# Patient Record
Sex: MALE | Race: WHITE | NOT HISPANIC OR LATINO | Employment: FULL TIME | URBAN - METROPOLITAN AREA
[De-identification: names, ages, dates, MRNs, and addresses within clinical notes are randomized per-mention and may not be internally consistent; named-entity substitution may affect disease eponyms.]

---

## 2018-07-06 ENCOUNTER — OFFICE VISIT - HEALTHEAST (OUTPATIENT)
Dept: FAMILY MEDICINE | Facility: CLINIC | Age: 23
End: 2018-07-06

## 2018-07-06 DIAGNOSIS — M54.41 RIGHT-SIDED LOW BACK PAIN WITH RIGHT-SIDED SCIATICA: ICD-10-CM

## 2021-04-30 ENCOUNTER — COMMUNICATION - HEALTHEAST (OUTPATIENT)
Dept: TELEHEALTH | Facility: CLINIC | Age: 26
End: 2021-04-30

## 2021-04-30 ENCOUNTER — OFFICE VISIT - HEALTHEAST (OUTPATIENT)
Dept: FAMILY MEDICINE | Facility: CLINIC | Age: 26
End: 2021-04-30

## 2021-04-30 DIAGNOSIS — K60.2 ANAL FISSURE: ICD-10-CM

## 2021-04-30 ASSESSMENT — MIFFLIN-ST. JEOR: SCORE: 2020.92

## 2021-05-10 ENCOUNTER — OFFICE VISIT - HEALTHEAST (OUTPATIENT)
Dept: FAMILY MEDICINE | Facility: CLINIC | Age: 26
End: 2021-05-10

## 2021-05-10 DIAGNOSIS — G89.29 CHRONIC LEFT SHOULDER PAIN: ICD-10-CM

## 2021-05-10 DIAGNOSIS — M25.512 CHRONIC LEFT SHOULDER PAIN: ICD-10-CM

## 2021-05-10 DIAGNOSIS — Z11.3 SCREEN FOR SEXUALLY TRANSMITTED DISEASES: ICD-10-CM

## 2021-05-10 DIAGNOSIS — Z00.00 ROUTINE MEDICAL EXAM: ICD-10-CM

## 2021-05-10 ASSESSMENT — ANXIETY QUESTIONNAIRES
IF YOU CHECKED OFF ANY PROBLEMS ON THIS QUESTIONNAIRE, HOW DIFFICULT HAVE THESE PROBLEMS MADE IT FOR YOU TO DO YOUR WORK, TAKE CARE OF THINGS AT HOME, OR GET ALONG WITH OTHER PEOPLE: NOT DIFFICULT AT ALL
5. BEING SO RESTLESS THAT IT IS HARD TO SIT STILL: NOT AT ALL
2. NOT BEING ABLE TO STOP OR CONTROL WORRYING: NOT AT ALL
7. FEELING AFRAID AS IF SOMETHING AWFUL MIGHT HAPPEN: NOT AT ALL
6. BECOMING EASILY ANNOYED OR IRRITABLE: NOT AT ALL
4. TROUBLE RELAXING: NOT AT ALL
1. FEELING NERVOUS, ANXIOUS, OR ON EDGE: NOT AT ALL
3. WORRYING TOO MUCH ABOUT DIFFERENT THINGS: NOT AT ALL
GAD7 TOTAL SCORE: 0

## 2021-05-10 ASSESSMENT — MIFFLIN-ST. JEOR: SCORE: 2025.46

## 2021-05-10 ASSESSMENT — PATIENT HEALTH QUESTIONNAIRE - PHQ9: SUM OF ALL RESPONSES TO PHQ QUESTIONS 1-9: 1

## 2021-05-11 ENCOUNTER — COMMUNICATION - HEALTHEAST (OUTPATIENT)
Dept: FAMILY MEDICINE | Facility: CLINIC | Age: 26
End: 2021-05-11

## 2021-05-11 LAB
C TRACH DNA SPEC QL PROBE+SIG AMP: NEGATIVE
N GONORRHOEA DNA SPEC QL NAA+PROBE: NEGATIVE

## 2021-05-27 VITALS
BODY MASS INDEX: 29.78 KG/M2 | OXYGEN SATURATION: 96 % | DIASTOLIC BLOOD PRESSURE: 82 MMHG | HEART RATE: 87 BPM | HEIGHT: 72 IN | WEIGHT: 219.9 LBS | SYSTOLIC BLOOD PRESSURE: 122 MMHG

## 2021-05-27 ASSESSMENT — PATIENT HEALTH QUESTIONNAIRE - PHQ9: SUM OF ALL RESPONSES TO PHQ QUESTIONS 1-9: 1

## 2021-05-28 ASSESSMENT — ANXIETY QUESTIONNAIRES: GAD7 TOTAL SCORE: 0

## 2021-06-01 VITALS — WEIGHT: 198.5 LBS

## 2021-06-05 VITALS
BODY MASS INDEX: 29.65 KG/M2 | HEART RATE: 76 BPM | OXYGEN SATURATION: 98 % | DIASTOLIC BLOOD PRESSURE: 64 MMHG | SYSTOLIC BLOOD PRESSURE: 116 MMHG | WEIGHT: 218.9 LBS | HEIGHT: 72 IN

## 2021-06-16 PROBLEM — M25.512 CHRONIC LEFT SHOULDER PAIN: Status: ACTIVE | Noted: 2021-05-11

## 2021-06-16 PROBLEM — G89.29 CHRONIC LEFT SHOULDER PAIN: Status: ACTIVE | Noted: 2021-05-11

## 2021-06-17 NOTE — PROGRESS NOTES
Assessment & Plan     Anal fissure    He likely has a bit of an anal fissure and I told him that he would benefit from doing some sitz baths and then drying off and then using the Anusol HC that I prescribed for him just below here.  He can try that for a couple of weeks and see how that goes.  We also talked about trying to avoid dry wiping as much as he can and I suggested getting some flushable wipes or using washcloth most of the time to try to see if he can soothe the skin a little bit as well.  If he continues to have problems he will let me know.    We also did talk about him coming in for a physical at some point which we can do at his convenience.    - hydrocortisone (ANUSOL-HC) 2.5 % rectal cream; Apply rectally 2 times daily    Prescription drug management  [id number]}     BMI:   Estimated body mass index is 29.69 kg/m  as calculated from the following:    Height as of this encounter: 6' (1.829 m).    Weight as of this encounter: 218 lb 14.4 oz (99.3 kg).       No follow-ups on file.    Hubert Solano MD  [facility]   Link Barberestuardotyrell is 25 y.o. and presents today for the following health issues   HPI     Patient here today as a new patient to our clinic.  He is concerned that he might have some hemorrhoids or otherwise some issues down in the rectal area.  He states that he has had some blood on the paper after going to the bathroom for a few weeks now.  It does not seem to be getting any worse but is also not getting better.  He thought he should get it checked out.  He has not really had a change in his bowel movements.  He does run possibly a little bit on the constipated side but nothing different for him.  He has not really tried anything over-the-counter for this yet.      Review of Systems    A comprehensive Review of Systems was performed, and other than the positives mentioned above, the ROS was negative.     Patient is new patient to the clinic.  Please see past medical history, surgical history, social history and family history, all of which were completed in their entirety today.           Objective    /64 (Patient Site: Left Arm, Patient Position: Sitting, Cuff Size: Adult Regular)   Pulse 76   Ht 6' (1.829 m)   Wt 218 lb 14.4 oz (99.3 kg)   SpO2 98%   BMI 29.69 kg/m    Body mass index is 29.69 kg/m .  Physical Exam    Well-appearing male in no acute distress.  Vital signs as noted.  Chest clear to auscultation.  Heart regular rate and rhythm.  The rectal exam looks pretty unremarkable with no obvious hemorrhoids seen.  No active bleeding seen.

## 2021-06-21 NOTE — LETTER
Letter by Hubert Solano MD at      Author: Hubert Solano MD Service: -- Author Type: --    Filed:  Encounter Date: 5/11/2021 Status: (Other)         Link GUAJARDO Genotyrell  [address]  [place] [address]             May 11, 2021         Dear Mr. Bhardwaj,    Below are the results from your recent visit:    Resulted Orders   Chlamydia trachomatis & Neisseria gonorrhoeae, Amplified Detection   Result Value Ref Range    Chlamydia trachomatis, Amplified Detection Negative Negative    Neisseria gonorrhoeae, Amplified Detection Negative Negative        GC and Chlamydia tests are negative    Please call with questions or contact us using [employer].    Sincerely,        Electronically signed by Hubert Solano MD

## 2021-06-26 NOTE — PROGRESS NOTES
Progress Notes by Alexus Meyer CNP at 7/6/2018  2:30 PM     Author: Alexus Meyer CNP Service: -- Author Type: Nurse Practitioner    Filed: 7/6/2018  2:56 PM Encounter Date: 7/6/2018 Status: Signed    : Alexus Meyer CNP (Nurse Practitioner)       ASSESSMENT:   1. Right-sided low back pain with right-sided sciatica  predniSONE (DELTASONE) 20 MG tablet    TiZANidine (ZANAFLEX) 4 MG capsule       PLAN:  Link Bhardwaj is a 22 y.o. male who presents to the clinic today for evaluation of back pain.    Differential diagnosis for back pain includes muscle spasm/strain, slipped disc w/ radicular pain including sciatica, slipped disc w/ cauda equina syndrome,vertebral fracture, vertebral tumor, epidural abscess / discitis, or pyelonephritis.      I do not believe a fracture to be the source of this patient's pain as there was no preceding blunt trauma.  Based on no trauma, no imaging of the spine was done.      I do not believe the pain is caused by an epidural abscess as the patient denies hx of IV drug use and does not have fevers/chills and no recent procedures.      I do not believe this patient's pain is from an infiltrative vertebral tumor as the patient does not have weight loss or night sweats and no known history of cancer.      I do not believe this patient's pain represents a rupture AAA.  There is no palpable, pulsatile mass on exam and patient does not have any ABD pain.      Patient do not have urinary symptoms or CVA tenderness to suggest pyelonephritis.      Based on history and exam, the most likely etiology of this patient's low back pain is muscle spasm/strain.  Emergent MRI is not indicated as this patient does not have new weakness or cauda equina syndrome.  Patient has no bowel or bladder incontinence. Will treat today with prednisone and zanaflex, patient to follow up with PCP if no improvement over the next 5-7 days.  Will seek emergency care with new  weakness/paresthesias, loss of continence, fever or worsening symptoms.      I discussed red flag symptoms, return precautions to clinic/ER and follow up care with patient/parent.  Expected clinical course, symptomatic cares advised. Questions answered. Patient/parent amenable with plan.    Patient Instructions:  Patient Instructions   Your back pain is likely due to a muscular strain.     I have prescribed you a short course of Prednisone to help decrease the inflammation in the area of the spasm. This should help to decrease associated numbness/tingling (nerve pain). Please take as directed. Be aware that this medication can cause insomnia, excitability, excessive hunger.  Please take it in the morning with food.    Do not take ibuprofen while on the steroid. If having pain, take Tylenol up to 1000mg, 4 times daily.    You may use the Zanaflex as needed for muscle spasm. Use caution while taking this medication, as it can make you drowsy. Do not take while driving, operating heavy machinery, or doing any activities requiring intense concentration.    Salanpas topical lidocaine ointment may also be helpful.    Try using a heating pad and/or warm baths.    Make sure to keep moving to avoid getting stiff. See below for stretching exercises.    If you develop fever, severe pain that prevents you from walking at all, weakness of your arms or legs, loss of bowel or bladder continence, or any other new concerning symptoms, go to the ER immediately.    Otherwise, follow up with primary care doctor as needed or if no improvement in pain in symptoms in 1 week.                    SUBJECTIVE:   Link Bhardwaj is a 22 y.o. male who presents today with right sided low back pain that began acutely during a dead lift yesterday at the gym. Pain was instant, dropped the weight.  No lower extremity weakness or paresthesias, no saddle anesthesia, no buttock or leg pain. No loss of bowel or bladder continence. No hx of CA, recent  procedures or IVDU. No fevers. No dysuria, hematuria, frequency.  Has taken ibuprofen without relief.      ROS:  Comprehensive 12 pt ROS completed, positives noted in HPI, otherwise negative.      Past Medical History:  There is no problem list on file for this patient.    none  Surgical History:  No past surgical history on file.    none    Family History:  No family history on file.    Reviewed; Non-contributory    History   Smoking Status   ? Never Smoker   Smokeless Tobacco   ? Never Used       Current Medications:  No current outpatient prescriptions on file prior to visit.     No current facility-administered medications on file prior to visit.        Allergies:   No Known Allergies    OBJECTIVE:   Vitals:    07/06/18 1438   BP: 126/74   Patient Site: Right Arm   Patient Position: Sitting   Cuff Size: Adult Regular   Pulse: 81   Resp: 18   Temp: 98.3  F (36.8  C)   TempSrc: Oral   SpO2: 99%   Weight: 198 lb 8 oz (90 kg)     Physical exam reveals a pleasant 22 y.o. male.   General Appearance:  Alert, cooperative, no distress, appears stated age. Afebrile. Appears comfortable sitting in chair. Rises from seated position without difficulty.  Integument: Warm, dry, no rashes or lesions.  HEENT: Atraumatic, normocephalic. Face nontraumatic. Conjunctiva clear, Lids normal.  Neck: Supple, no meningismus. No Cspine tenderness. Neck ROM intact.  Respiratory: No distress. Lungs clear to ausculation bilaterally. No crackles, wheezes, rhonchi or stridor.  Cardiovascular: Regular rate and rhythm, no murmur, rub or gallop. No obvious chest wall deformities.  Abdomen: soft, nontender, non-distended. No masses. No CVAT.  Musculoskeletal: Back: No Lspine or Tspine tenderness or crepitus to palpation. Mild TTP right lumbar paraspinal musculature. Strength testing: hip flexion, extension 5/5 bilaterally, knee flexion/extension 5/5 bilaterally, ankle plantar/dorsiflexion 5/5 bilaterally.  Straight leg raise positive to right.  Gait: observed, no antalgia or abnormalities. Steady gait. 2+ bilateral pedal pulses.  Normal toe raise, heel walk. Normal flexion and extension of toes.  Neurologic: Alert and orientated appropriately. No focal deficits. Sensation intact in distal LEs. DTRs: patellar 2+ bilaterally, achilles 2+ bilaterally.  Psych: Normal mood and affect.           RADIOLOGY    none  LABORATORY STUDIES    none      Alexus Meyer, CNP

## 2021-06-30 NOTE — PROGRESS NOTES
Progress Notes by Hubert Solano MD at 5/10/2021  4:00 PM     Author: Hubert Solano MD Service: -- Author Type: Physician    Filed: 5/11/2021  7:13 AM Encounter Date: 5/10/2021 Status: Signed    : Hubert Solano MD (Physician)       MALE PREVENTATIVE EXAM    Assessment and Plan:     Patient has been advised of split billing requirements and indicates understanding: Yes    1. Routine medical exam    Generally the patient is doing very well.  We discussed healthy lifestyles, including adequate exercise (3-4 times a week for 20-30 minutes), and a healthy diet.  Patient should return for annual physicals, and we can also see them here as needed.       2. Chronic left shoulder pain    I assured him that I think his shoulder exam is relatively unremarkable, but he may have a slightly subluxable shoulder that he is starting to notice a bit now.  Talked about some different options for him but for now he is just going to watch it and if it seems to be getting worse or not improving, or causing more pain or function issues then he will let me know and we can consider either an MRI, some physical therapy, or a orthopedic referral.      3. Screen for sexually transmitted diseases    He would like to get some testing done as listed below.  He has done the symptoms right now but would like to just make sure if things seem okay.      - Chlamydia trachomatis & Neisseria gonorrhoeae, Amplified Detection     Next follow up:  No follow-ups on file.    Immunization Review  Adult Imm Review: No immunizations due today      I discussed the following with the patient:   Adult Healthy Living: Importance of regular exercise  Healthy nutrition  Getting adequate sleep  Stress management  Use of seat belts  STI prevention        Subjective:   Chief Complaint: Link Bhardwaj is an 25 y.o. male here for a preventative health visit.    Patient has been advised of split billing requirements and indicates understanding: Yes  HPI: Patient  "here today for a physical.  We just saw him recently for some rectal itching and he states that that is improving with the cream that we gave him.    He has no significantly major issues, other than like to talk about getting screened for chlamydia and gonorrhea is that is a concern for him having been with a couple of partners unprotected.    He also has some issues with his left shoulder which are pretty minimal but starting to bother him a bit.  He does do some pretty significant lifting of weights and the like, and he has noticed that his left shoulder just seems a little \"different\" than the other one and is not really sure how to describe it but it feels like sometimes it partially comes out of joint or gives him more discomfort than the other side.    Healthy Habits  Are you taking a daily aspirin? No  Do you typically exercising at least 40 min, 3-4 times per week?  Yes  Do you usually eat at least 4 servings of fruit and vegetables a day, include whole grains and fiber and avoid regularly eating high fat foods? Yes  Have you had an eye exam in the past two years? NO  Do you see a dentist twice per year? NO  Do you have any concerns regarding sleep? No      Review of Systems:  Please see above.  The rest of the review of systems are negative for all systems.     Cancer Screening     Patient has no health maintenance due at this time          Patient Care Team:  Hubert Solano MD as PCP - General (Family Medicine)        History     Not marked as reviewed during this visit.            Objective:   Vital Signs:   Visit Vitals  /82 (Patient Site: Left Arm, Patient Position: Sitting, Cuff Size: Adult Large)   Pulse 87   Ht 6' (1.829 m)   Wt 219 lb 14.4 oz (99.7 kg)   SpO2 96%   BMI 29.82 kg/m           PHYSICAL EXAM    Well-appearing male in no acute distress.  Vital signs as noted.  Ears are clear bilaterally.  Eyes and nose are normal.  Oropharynx pink and moist without erythema or exudate.  Neck is " without lymphadenopathy.    Chest clear to auscultation.  Heart regular rate and rhythm.  Abdomen soft nontender nondistended bowel sounds present all quadrants.    His left shoulder shows normal range of motion with good strength and sensation noted.  Negative Neer's sign.  Negative supraspinatus test.    Extremities are unremarkable.  Neurologic exam is nonfocal.  Genital exam is normal.           Medication List          Accurate as of May 10, 2021 11:59 PM. If you have any questions, ask your nurse or doctor.            CONTINUE taking these medications    hydrocortisone 2.5 % rectal cream  Also known as: Anusol-HC  INSTRUCTIONS: Apply rectally 2 times daily               Additional Screenings Completed Today:

## 2021-07-23 ENCOUNTER — TELEPHONE (OUTPATIENT)
Dept: FAMILY MEDICINE | Facility: CLINIC | Age: 26
End: 2021-07-23

## 2021-07-23 DIAGNOSIS — G89.29 CHRONIC LEFT SHOULDER PAIN: Primary | ICD-10-CM

## 2021-07-23 DIAGNOSIS — M25.562 CHRONIC PAIN OF LEFT KNEE: ICD-10-CM

## 2021-07-23 DIAGNOSIS — G89.29 CHRONIC PAIN OF LEFT KNEE: ICD-10-CM

## 2021-07-23 DIAGNOSIS — M25.512 CHRONIC LEFT SHOULDER PAIN: Primary | ICD-10-CM

## 2021-07-23 NOTE — TELEPHONE ENCOUNTER
Reason for Call: Request for an order or referral:    Order or referral being requested: Patient is requesting referral for Physical Therapy for his right shoulder and left hip pain.     Date needed: as soon as possible    Has the patient been seen by the PCP for this problem? YES    Phone number Patient can be reached at:  Cell number on file:    Telephone Information:   Mobile [phone]       Best Time:  anytime    Can we leave a detailed message on this number?  YES    Call taken on 7/23/2021 at 9:25 AM by Skyla Klein

## 2021-08-09 ENCOUNTER — HOSPITAL ENCOUNTER (OUTPATIENT)
Dept: PHYSICAL THERAPY | Facility: REHABILITATION | Age: 26
End: 2021-08-09
Attending: FAMILY MEDICINE
Payer: COMMERCIAL

## 2021-08-09 DIAGNOSIS — M25.511 ACUTE PAIN OF RIGHT SHOULDER: Primary | ICD-10-CM

## 2021-08-09 DIAGNOSIS — M25.562 CHRONIC PAIN OF LEFT KNEE: ICD-10-CM

## 2021-08-09 DIAGNOSIS — M25.512 CHRONIC LEFT SHOULDER PAIN: ICD-10-CM

## 2021-08-09 DIAGNOSIS — G89.29 CHRONIC LEFT SHOULDER PAIN: ICD-10-CM

## 2021-08-09 DIAGNOSIS — M25.552 HIP PAIN, LEFT: ICD-10-CM

## 2021-08-09 DIAGNOSIS — G89.29 CHRONIC PAIN OF LEFT KNEE: ICD-10-CM

## 2021-08-09 PROCEDURE — 97161 PT EVAL LOW COMPLEX 20 MIN: CPT | Mod: GP | Performed by: PHYSICAL THERAPIST

## 2021-08-09 PROCEDURE — 97535 SELF CARE MNGMENT TRAINING: CPT | Mod: GP | Performed by: PHYSICAL THERAPIST

## 2021-08-09 PROCEDURE — 97110 THERAPEUTIC EXERCISES: CPT | Mod: GP | Performed by: PHYSICAL THERAPIST

## 2021-08-26 ENCOUNTER — THERAPY VISIT (OUTPATIENT)
Dept: PHYSICAL THERAPY | Facility: CLINIC | Age: 26
End: 2021-08-26
Payer: COMMERCIAL

## 2021-08-26 DIAGNOSIS — M67.959 TENDINOPATHY OF GLUTEAL REGION: ICD-10-CM

## 2021-08-26 DIAGNOSIS — M25.519 ACROMIOCLAVICULAR JOINT PAIN: Primary | ICD-10-CM

## 2021-08-26 PROCEDURE — 97530 THERAPEUTIC ACTIVITIES: CPT | Mod: GP | Performed by: PHYSICAL THERAPIST

## 2021-08-26 PROCEDURE — 97110 THERAPEUTIC EXERCISES: CPT | Mod: GP | Performed by: PHYSICAL THERAPIST

## 2021-08-26 NOTE — PROGRESS NOTES
Physical Therapy Initial Evaluation  Subjective:  The history is provided by the patient. No  was used.   Patient Health History    Date of Onset: date of order: 8/9/21.                                                                 Objective:  System    Physical Exam    General     ROS    Assessment/Plan:    {REHAB NOTES:105783}

## 2021-09-09 NOTE — PROGRESS NOTES
Outpatient Physical Therapy Discharge Note     Patient: Link Bhardwaj  : 1995    Beginning/End Dates of Reporting Period:  22 for evaluation only    Referring Provider: Dr. Solano    Therapy Diagnosis: Hip and shoulder pain     Client Self Report: Pain in shoulder with pushing like bench press.  Have not lifted in 2 months so slightly better but still painful to touch.  Left hip has hurt past 1 1/2 months and more with any squatting.  Squatted first time in two weeks and pain returned.      Goals:  Goal Identifier lifting at gym   Goal Description able to resume pushing movements such as bench press, at gym with pain 0-1/10   Target Date 10/08/21   Date Met      Progress (detail required for progress note):       Goal Identifier squatting   Goal Description Able to squat at the gym for exercise and at home to pick objects up from floor with pain 0-110   Target Date 10/08/21   Date Met      Progress (detail required for progress note):       Goal Identifier     Goal Description     Target Date     Date Met      Progress (detail required for progress note):       Goal Identifier     Goal Description     Target Date     Date Met      Progress (detail required for progress note):       Goal Identifier     Goal Description     Target Date     Date Met      Progress (detail required for progress note):       Goal Identifier     Goal Description     Target Date     Date Met      Progress (detail required for progress note):       Goal Identifier     Goal Description     Target Date     Date Met      Progress (detail required for progress note):       Goal Identifier     Goal Description     Target Date     Date Met      Progress (detail required for progress note):             Plan:  Discharge from therapy.    Discharge:    Reason for Discharge: pt moved and will see another provider    Equipment Issued: none    Discharge Plan: Patient to continue home program.

## 2021-09-09 NOTE — ADDENDUM NOTE
Encounter addended by: Caitlin No, PT on: 9/9/2021 7:46 AM   Actions taken: Episode edited, Episode resolved, Clinical Note Signed

## 2021-09-16 ENCOUNTER — THERAPY VISIT (OUTPATIENT)
Dept: PHYSICAL THERAPY | Facility: CLINIC | Age: 26
End: 2021-09-16
Payer: COMMERCIAL

## 2021-09-16 DIAGNOSIS — M25.552 HIP PAIN, LEFT: ICD-10-CM

## 2021-09-16 DIAGNOSIS — M67.959 TENDINOPATHY OF GLUTEAL REGION: Primary | ICD-10-CM

## 2021-09-16 DIAGNOSIS — M25.519 ACROMIOCLAVICULAR JOINT PAIN: ICD-10-CM

## 2021-09-16 DIAGNOSIS — M25.512 CHRONIC LEFT SHOULDER PAIN: ICD-10-CM

## 2021-09-16 DIAGNOSIS — G89.29 CHRONIC LEFT SHOULDER PAIN: ICD-10-CM

## 2021-09-16 PROCEDURE — 97110 THERAPEUTIC EXERCISES: CPT | Mod: GP | Performed by: PHYSICAL THERAPIST

## 2021-09-22 ENCOUNTER — THERAPY VISIT (OUTPATIENT)
Dept: PHYSICAL THERAPY | Facility: CLINIC | Age: 26
End: 2021-09-22
Payer: COMMERCIAL

## 2021-09-22 DIAGNOSIS — M25.512 CHRONIC LEFT SHOULDER PAIN: Primary | ICD-10-CM

## 2021-09-22 DIAGNOSIS — G89.29 CHRONIC LEFT SHOULDER PAIN: Primary | ICD-10-CM

## 2021-09-22 DIAGNOSIS — M25.552 HIP PAIN, LEFT: ICD-10-CM

## 2021-09-22 PROCEDURE — 97530 THERAPEUTIC ACTIVITIES: CPT | Mod: GP | Performed by: PHYSICAL THERAPIST

## 2021-09-22 PROCEDURE — 97110 THERAPEUTIC EXERCISES: CPT | Mod: GP | Performed by: PHYSICAL THERAPIST

## 2021-09-22 NOTE — PROGRESS NOTES
Therapist Impression:   Suspect impingement with lifting secondary to poor posture.  Advised floor bench and/or towel barbell bench.  Suspect quadratus femoris tendinopathy.  Initiated clamshell isometrics holds    GOALS: Lifting    NEXT: Soft tissue, isotonics in future    PTRX: NA    Subjective:  Feels shoulder a little bit more.  Hip is feeling ok.  But has not tested hip as much.  Shoulder is bothered by flat bench>incline and more weight.  Squats bother hip.  Cross body has improved.    Objective:  Shoulder  STATIC POSTURE  Forward head: mild   Rounded shoulders:moderate  Shoulder internally rotated: moderate     SHOULDER RANGE OF MOTION  Full pain-free    SHOULDER STRENGTH  MMT Flexion Scaption ER IR   Left 5-/5 5/5 5-/5 5/5   Right 5-/5 5/5 5-/5 5/5     HIP    Dynamic Movement Screen  Double limb squat observations: Good technique/no significant findings    Range of Motion    Hip ROM Flexion ER  IR Extension   Left 120 45 25 20   Right 120 45 25 20      Flexibility Quadriceps Hamstrings Ankle   Left wnl mild na   Right wnl mild na     Hip and Knee Strength   MMT Hip Abduction Hip Extension   Left 4+/5 4/5   Right 4+/5 4/5     Special Tests   PEPPERIR CHET   Left Negative Negative   Right Negative Negative

## 2021-11-19 PROBLEM — G89.29 CHRONIC LEFT SHOULDER PAIN: Status: RESOLVED | Noted: 2021-05-11 | Resolved: 2021-11-19

## 2021-11-19 PROBLEM — M25.552 HIP PAIN, LEFT: Status: RESOLVED | Noted: 2021-09-22 | Resolved: 2021-11-19

## 2021-11-19 PROBLEM — M25.512 CHRONIC LEFT SHOULDER PAIN: Status: RESOLVED | Noted: 2021-05-11 | Resolved: 2021-11-19

## 2022-07-23 ENCOUNTER — HEALTH MAINTENANCE LETTER (OUTPATIENT)
Age: 27
End: 2022-07-23

## 2022-08-01 ENCOUNTER — OFFICE VISIT (OUTPATIENT)
Dept: FAMILY MEDICINE | Facility: CLINIC | Age: 27
End: 2022-08-01
Payer: COMMERCIAL

## 2022-08-01 VITALS
SYSTOLIC BLOOD PRESSURE: 108 MMHG | HEIGHT: 72 IN | BODY MASS INDEX: 27.9 KG/M2 | WEIGHT: 206 LBS | TEMPERATURE: 97.5 F | RESPIRATION RATE: 22 BRPM | HEART RATE: 83 BPM | OXYGEN SATURATION: 99 % | DIASTOLIC BLOOD PRESSURE: 62 MMHG

## 2022-08-01 DIAGNOSIS — Z00.00 ROUTINE GENERAL MEDICAL EXAMINATION AT A HEALTH CARE FACILITY: Primary | ICD-10-CM

## 2022-08-01 DIAGNOSIS — Z11.4 SCREENING FOR HIV (HUMAN IMMUNODEFICIENCY VIRUS): ICD-10-CM

## 2022-08-01 DIAGNOSIS — Z11.3 SCREEN FOR STD (SEXUALLY TRANSMITTED DISEASE): ICD-10-CM

## 2022-08-01 DIAGNOSIS — Z11.59 NEED FOR HEPATITIS C SCREENING TEST: ICD-10-CM

## 2022-08-01 DIAGNOSIS — Z23 HIGH PRIORITY FOR 2019-NCOV VACCINE: ICD-10-CM

## 2022-08-01 LAB
BASOPHILS # BLD AUTO: 0 10E3/UL (ref 0–0.2)
BASOPHILS NFR BLD AUTO: 1 %
EOSINOPHIL # BLD AUTO: 0.2 10E3/UL (ref 0–0.7)
EOSINOPHIL NFR BLD AUTO: 3 %
ERYTHROCYTE [DISTWIDTH] IN BLOOD BY AUTOMATED COUNT: 13.2 % (ref 10–15)
HCT VFR BLD AUTO: 46.1 % (ref 40–53)
HGB BLD-MCNC: 15.6 G/DL (ref 13.3–17.7)
LYMPHOCYTES # BLD AUTO: 1.7 10E3/UL (ref 0.8–5.3)
LYMPHOCYTES NFR BLD AUTO: 29 %
MCH RBC QN AUTO: 28.7 PG (ref 26.5–33)
MCHC RBC AUTO-ENTMCNC: 33.8 G/DL (ref 31.5–36.5)
MCV RBC AUTO: 85 FL (ref 78–100)
MONOCYTES # BLD AUTO: 0.4 10E3/UL (ref 0–1.3)
MONOCYTES NFR BLD AUTO: 7 %
NEUTROPHILS # BLD AUTO: 3.4 10E3/UL (ref 1.6–8.3)
NEUTROPHILS NFR BLD AUTO: 61 %
PLATELET # BLD AUTO: 214 10E3/UL (ref 150–450)
RBC # BLD AUTO: 5.44 10E6/UL (ref 4.4–5.9)
WBC # BLD AUTO: 5.6 10E3/UL (ref 4–11)

## 2022-08-01 PROCEDURE — 87591 N.GONORRHOEAE DNA AMP PROB: CPT | Performed by: FAMILY MEDICINE

## 2022-08-01 PROCEDURE — 36415 COLL VENOUS BLD VENIPUNCTURE: CPT | Performed by: FAMILY MEDICINE

## 2022-08-01 PROCEDURE — 91305 COVID-19,PF,PFIZER (12+ YRS): CPT | Performed by: FAMILY MEDICINE

## 2022-08-01 PROCEDURE — 86780 TREPONEMA PALLIDUM: CPT | Performed by: FAMILY MEDICINE

## 2022-08-01 PROCEDURE — 87491 CHLMYD TRACH DNA AMP PROBE: CPT | Performed by: FAMILY MEDICINE

## 2022-08-01 PROCEDURE — 80061 LIPID PANEL: CPT | Performed by: FAMILY MEDICINE

## 2022-08-01 PROCEDURE — 85025 COMPLETE CBC W/AUTO DIFF WBC: CPT | Performed by: FAMILY MEDICINE

## 2022-08-01 PROCEDURE — 87389 HIV-1 AG W/HIV-1&-2 AB AG IA: CPT | Performed by: FAMILY MEDICINE

## 2022-08-01 PROCEDURE — 0054A COVID-19,PF,PFIZER (12+ YRS): CPT | Performed by: FAMILY MEDICINE

## 2022-08-01 PROCEDURE — 99395 PREV VISIT EST AGE 18-39: CPT | Mod: 25 | Performed by: FAMILY MEDICINE

## 2022-08-01 PROCEDURE — 86803 HEPATITIS C AB TEST: CPT | Performed by: FAMILY MEDICINE

## 2022-08-01 PROCEDURE — 80053 COMPREHEN METABOLIC PANEL: CPT | Performed by: FAMILY MEDICINE

## 2022-08-01 ASSESSMENT — ENCOUNTER SYMPTOMS
CHILLS: 0
PSYCHIATRIC NEGATIVE: 1
ABDOMINAL PAIN: 0
RESPIRATORY NEGATIVE: 1
NEUROLOGICAL NEGATIVE: 1
HEMATOCHEZIA: 0
MUSCULOSKELETAL NEGATIVE: 1
HEMATOLOGIC/LYMPHATIC NEGATIVE: 1
HEMATURIA: 0
ENDOCRINE NEGATIVE: 1
ALLERGIC/IMMUNOLOGIC NEGATIVE: 1

## 2022-08-01 ASSESSMENT — PAIN SCALES - GENERAL: PAINLEVEL: NO PAIN (0)

## 2022-08-01 NOTE — PROGRESS NOTES
SUBJECTIVE:   CC: Link Bhardwaj is an 26 year old male who presents for preventative health visit.   Physical exam , no past medical history  Would like to have screening for sexually transmitted disease.  He has no symptoms, no previous history.    Patient has been advised of split billing requirements and indicates understanding: Yes  Healthy Habits:     Getting at least 3 servings of Calcium per day:  Yes    Bi-annual eye exam:  NO    Dental care twice a year:  NO    Sleep apnea or symptoms of sleep apnea:  None    Diet:  Regular (no restrictions)    Frequency of exercise:  4-5 days/week    Duration of exercise:  45-60 minutes    Taking medications regularly:  Yes    Barriers to taking medications:  None    Medication side effects:  Not applicable    PHQ-2 Total Score: 0    Additional concerns today:  Yes      Today's PHQ-2 Score:   PHQ-2 ( 1999 [employer]) 8/1/2022   Q1: Little interest or pleasure in doing things 0   Q2: Feeling down, depressed or hopeless 0   PHQ-2 Score 0   Q1: Little interest or pleasure in doing things Not at all   Q2: Feeling down, depressed or hopeless Not at all   PHQ-2 Score 0       Abuse: Current or Past(Physical, Sexual or Emotional)- No  Do you feel safe in your environment? Yes    Have you ever done Advance Care Planning? (For example, a Health Directive, POLST, or a discussion with a medical provider or your loved ones about your wishes): No, advance care planning information given to patient to review.  Patient declined advance care planning discussion at this time.    Social History     Tobacco Use     Smoking status: Never Smoker     Smokeless tobacco: Never Used   Substance Use Topics     Alcohol use: Yes     Alcohol/week: 1.0 standard drink     If you drink alcohol do you typically have >3 drinks per day or >7 drinks per week? No    Alcohol Use 8/1/2022   Prescreen: >3 drinks/day or >7 drinks/week? No   No flowsheet data found.    Last PSA: No results found for:  PSA    Reviewed orders with patient. Reviewed health maintenance and updated orders accordingly - Yes  Labs reviewed in EPIC  BP Readings from Last 3 Encounters:   08/01/22 108/62   05/10/21 122/82   04/30/21 116/64    Wt Readings from Last 3 Encounters:   08/01/22 93.4 kg (206 lb)   05/10/21 99.7 kg (219 lb 14.4 oz)   04/30/21 99.3 kg (218 lb 14.4 oz)                  Patient Active Problem List   Diagnosis   (none) - all problems resolved or deleted     History reviewed. No pertinent surgical history.    Social History     Tobacco Use     Smoking status: Never Smoker     Smokeless tobacco: Never Used   Substance Use Topics     Alcohol use: Yes     Alcohol/week: 1.0 standard drink     History reviewed. No pertinent family history.      Current Outpatient Medications   Medication Sig Dispense Refill     hydrocortisone (ANUSOL-HC) 2.5 % rectal cream [HYDROCORTISONE (ANUSOL-HC) 2.5 % RECTAL CREAM] Apply rectally 2 times daily (Patient not taking: Reported on 8/1/2022) 30 g 1       Reviewed and updated as needed this visit by clinical staff   Tobacco  Allergies  Meds   Med Hx  Surg Hx  Fam Hx  Soc Hx          Reviewed and updated as needed this visit by Provider                   History reviewed. No pertinent past medical history.   History reviewed. No pertinent surgical history.  OB History   No obstetric history on file.       Review of Systems   Constitutional: Negative for chills.   HENT: Negative for congestion.    Respiratory: Negative.    Cardiovascular: Negative for chest pain.   Gastrointestinal: Negative for abdominal pain and hematochezia.   Endocrine: Negative.    Genitourinary: Negative for hematuria.   Musculoskeletal: Negative.    Skin: Negative.    Allergic/Immunologic: Negative.    Neurological: Negative.    Hematological: Negative.    Psychiatric/Behavioral: Negative.      CONSTITUTIONAL: NEGATIVE for fever, chills, change in weight  INTEGUMENTARY/SKIN: NEGATIVE for worrisome rashes, moles or  "lesions  EYES: NEGATIVE for vision changes or irritation  ENT: NEGATIVE for ear, mouth and throat problems  RESP: NEGATIVE for significant cough or SOB  CV: NEGATIVE for chest pain, palpitations or peripheral edema  GI: NEGATIVE for nausea, abdominal pain, heartburn, or change in bowel habits   male: negative for dysuria, hematuria, decreased urinary stream, erectile dysfunction, urethral discharge  MUSCULOSKELETAL: NEGATIVE for significant arthralgias or myalgia  NEURO: NEGATIVE for weakness, dizziness or paresthesias  ENDOCRINE: NEGATIVE for temperature intolerance, skin/hair changes  HEME/ALLERGY/IMMUNE: NEGATIVE for bleeding problems  PSYCHIATRIC: NEGATIVE for changes in mood or affect    OBJECTIVE:   Pulse 83   Temp 97.5  F (36.4  C) (Tympanic)   Resp 22   Ht 1.83 m (6' 0.05\")   Wt 93.4 kg (206 lb)   SpO2 99%   BMI 27.90 kg/m      Physical Exam  GENERAL: healthy, alert and no distress  EYES: Eyes grossly normal to inspection, PERRL and conjunctivae and sclerae normal  HENT: ear canals and TM's normal, nose and mouth without ulcers or lesions  NECK: no adenopathy, no asymmetry, masses, or scars and thyroid normal to palpation  RESP: lungs clear to auscultation - no rales, rhonchi or wheezes  CV: regular rate and rhythm, normal S1 S2, no S3 or S4, no murmur, click or rub, no peripheral edema and peripheral pulses strong  ABDOMEN: soft, nontender, no hepatosplenomegaly, no masses and bowel sounds normal  MS: no gross musculoskeletal defects noted, no edema  SKIN: no suspicious lesions or rashes  NEURO: Normal strength and tone, mentation intact and speech normal  PSYCH: mentation appears normal, affect normal/bright    Diagnostic Test Results:  Labs reviewed in Epic  Orders Placed This Encounter   Procedures     HIV Antigen Antibody Combo     Standing Status:   Future     Standing Expiration Date:   8/1/2023     Hepatitis C Screen Reflex to HCV RNA Quant and Genotype     The Hepatitis C Screen testing will " be used for patients born between 1945 and 1965 following the CDC recommendations. HEP C screening testing can also be ordered for any patient for which it is clinically recommended.     Standing Status:   Future     Standing Expiration Date:   8/1/2023     Treponema Abs w Reflex to RPR and Titer     Standing Status:   Future     Standing Expiration Date:   8/1/2023     Comprehensive metabolic panel (BMP + Alb, Alk Phos, ALT, AST, Total. Bili, TP)     Standing Status:   Future     Standing Expiration Date:   8/1/2023     Lipid panel reflex to direct LDL Fasting     Standing Status:   Future     Standing Expiration Date:   8/1/2023     Order Specific Question:   Perform labs while fasting or non-fasting?     Answer:   Fasting     Chlamydia trachomatis PCR     Standing Status:   Future     Standing Expiration Date:   8/1/2023     Neisseria gonorrhoeae PCR - Clinic Collect       ASSESSMENT/PLAN:   (Z00.00) Routine general medical examination at a health care facility  (primary encounter diagnosis)  Comment: normal exam  Plan: Comprehensive metabolic panel (BMP + Alb, Alk         Phos, ALT, AST, Total. Bili, TP), CBC with         platelets and differential, Lipid panel reflex         to direct LDL Fasting        Routine preventive care discussed.  advised with diet, increase physical activities, exercises.  Try to exercises 20- 30 minutes , 5 days a week.  Healthy life style.  One year annual exam recommended.    (Z11.4) Screening for HIV (human immunodeficiency virus)  Comment:   Plan: HIV Antigen Antibody Combo        screening    (Z11.59) Need for hepatitis C screening test  Comment:   Plan: Hepatitis C Screen Reflex to HCV RNA Quant and         Genotype        screening    (Z11.3) Screen for STD (sexually transmitted disease)  Comment:   Plan: Chlamydia trachomatis PCR, Neisseria         gonorrhoeae PCR - Clinic Collect, Treponema Abs        w Reflex to RPR and Titer        Screening no symptoms.      Patient has  "been advised of split billing requirements and indicates understanding: Yes    COUNSELING:   Reviewed preventive health counseling, as reflected in patient instructions       Regular exercise       Healthy diet/nutrition       Immunizations    Vaccinated for: COVID booster.            Safe sex practices/STD prevention       Consider Hep C screening for all patients one time for ages 18-79 years       Syphilis screening for high risk patients        HIV screeninx in teen years, 1x in adult years, and at intervals if high risk    Estimated body mass index is 27.9 kg/m  as calculated from the following:    Height as of this encounter: 1.83 m (6' 0.05\").    Weight as of this encounter: 93.4 kg (206 lb).     Weight management plan: Discussed healthy diet and exercise guidelines    He reports that he has never smoked. He has never used smokeless tobacco.      Counseling Resources:  ATP IV Guidelines  Pooled Cohorts Equation Calculator  FRAX Risk Assessment  ICSI Preventive Guidelines  Dietary Guidelines for Americans,   USDA's MyPlate  ASA Prophylaxis  Lung CA Screening    Lisa Hernandez MD  [facility]  "

## 2022-08-02 LAB
ALBUMIN SERPL-MCNC: 4.1 G/DL (ref 3.4–5)
ALP SERPL-CCNC: 58 U/L (ref 40–150)
ALT SERPL W P-5'-P-CCNC: 47 U/L (ref 0–70)
ANION GAP SERPL CALCULATED.3IONS-SCNC: 11 MMOL/L (ref 3–14)
AST SERPL W P-5'-P-CCNC: 30 U/L (ref 0–45)
BILIRUB SERPL-MCNC: 2.5 MG/DL (ref 0.2–1.3)
BUN SERPL-MCNC: 16 MG/DL (ref 7–30)
C TRACH DNA SPEC QL NAA+PROBE: NEGATIVE
CALCIUM SERPL-MCNC: 9.4 MG/DL (ref 8.5–10.1)
CHLORIDE BLD-SCNC: 106 MMOL/L (ref 94–109)
CHOLEST SERPL-MCNC: 172 MG/DL
CO2 SERPL-SCNC: 26 MMOL/L (ref 20–32)
CREAT SERPL-MCNC: 0.8 MG/DL (ref 0.66–1.25)
FASTING STATUS PATIENT QL REPORTED: NO
GFR SERPL CREATININE-BSD FRML MDRD: >90 ML/MIN/1.73M2
GLUCOSE BLD-MCNC: 100 MG/DL (ref 70–99)
HCV AB SERPL QL IA: NONREACTIVE
HDLC SERPL-MCNC: 45 MG/DL
HIV 1+2 AB+HIV1 P24 AG SERPL QL IA: NONREACTIVE
LDLC SERPL CALC-MCNC: 98 MG/DL
N GONORRHOEA DNA SPEC QL NAA+PROBE: NEGATIVE
NONHDLC SERPL-MCNC: 127 MG/DL
POTASSIUM BLD-SCNC: 3.7 MMOL/L (ref 3.4–5.3)
PROT SERPL-MCNC: 7.7 G/DL (ref 6.8–8.8)
SODIUM SERPL-SCNC: 143 MMOL/L (ref 133–144)
T PALLIDUM AB SER QL: NONREACTIVE
TRIGL SERPL-MCNC: 144 MG/DL

## 2022-10-01 ENCOUNTER — HEALTH MAINTENANCE LETTER (OUTPATIENT)
Age: 27
End: 2022-10-01

## 2023-10-15 ENCOUNTER — HEALTH MAINTENANCE LETTER (OUTPATIENT)
Age: 28
End: 2023-10-15

## 2024-12-08 ENCOUNTER — HEALTH MAINTENANCE LETTER (OUTPATIENT)
Age: 29
End: 2024-12-08